# Patient Record
Sex: FEMALE | Race: OTHER | NOT HISPANIC OR LATINO | URBAN - METROPOLITAN AREA
[De-identification: names, ages, dates, MRNs, and addresses within clinical notes are randomized per-mention and may not be internally consistent; named-entity substitution may affect disease eponyms.]

---

## 2019-05-25 ENCOUNTER — EMERGENCY (EMERGENCY)
Facility: HOSPITAL | Age: 54
LOS: 1 days | Discharge: HOME | End: 2019-05-25
Attending: EMERGENCY MEDICINE
Payer: COMMERCIAL

## 2019-05-25 VITALS
SYSTOLIC BLOOD PRESSURE: 142 MMHG | HEIGHT: 60 IN | TEMPERATURE: 98 F | OXYGEN SATURATION: 96 % | WEIGHT: 158.07 LBS | HEART RATE: 92 BPM | RESPIRATION RATE: 16 BRPM | DIASTOLIC BLOOD PRESSURE: 80 MMHG

## 2019-05-25 PROCEDURE — 99283 EMERGENCY DEPT VISIT LOW MDM: CPT | Mod: 25

## 2019-05-26 PROCEDURE — 99283 EMERGENCY DEPT VISIT LOW MDM: CPT

## 2019-05-26 RX ORDER — DIPHENHYDRAMINE HCL 50 MG
1 CAPSULE ORAL
Qty: 15 | Refills: 0
Start: 2019-05-26 | End: 2019-05-30

## 2019-05-26 RX ORDER — FAMOTIDINE 10 MG/ML
1 INJECTION INTRAVENOUS
Qty: 10 | Refills: 0
Start: 2019-05-26 | End: 2019-05-30

## 2019-05-26 RX ORDER — FAMOTIDINE 10 MG/ML
20 INJECTION INTRAVENOUS ONCE
Refills: 0 | Status: COMPLETED | OUTPATIENT
Start: 2019-05-26 | End: 2019-05-26

## 2019-05-26 RX ORDER — DIPHENHYDRAMINE HCL 50 MG
25 CAPSULE ORAL ONCE
Refills: 0 | Status: COMPLETED | OUTPATIENT
Start: 2019-05-26 | End: 2019-05-26

## 2019-05-26 RX ADMIN — Medication 5 MILLIGRAM(S): at 01:01

## 2019-05-26 RX ADMIN — FAMOTIDINE 20 MILLIGRAM(S): 10 INJECTION INTRAVENOUS at 01:01

## 2019-05-26 RX ADMIN — Medication 25 MILLIGRAM(S): at 01:01

## 2019-05-26 NOTE — ED ADULT NURSE NOTE - NSSUHOSCREENINGYN_ED_ALL_ED
PRE-SEDATION ASSESSMENT    CONSENT  Consent for procedure and sedation obtained: Yes    MEDICAL HISTORY  Significant medical/surgical history: No  Past Complications with Sedation/Anesthesia: No  Significant Family History: No  Smoking History: No  Alcohol/Drug abuse: No  Possible Pregnancy (LMP): Not Applicable  Cardiac History: No  Respiratory History: No    PHYSICAL EXAM  History and Physical Reviewed: Patient has valid H&P within 30 days. I have reviewed and there are no changes.  Airway Risk History: No previous complications  Airway Anatomy : Class I  Heart : Normal  Lungs : Normal  LOC/Mental Status : Normal    OTHER FINDINGS  Reviewed current medications and allergies: Yes  Pertinent lab/diagnostic test reviewed: Yes    SEDATION RISK ASSESSMENT  Risk Status ASA: Class I - Normal, healthy patient  Plan for Sedation: Moderate Sedation  Indications for Procedure/Pre-Procedure Diagnosis and Planned Procedure: reduce anxiety  EKG Monitoring: Yes    NARRATIVE FINDINGS      Yes - the patient is able to be screened

## 2019-05-26 NOTE — ED PROVIDER NOTE - CLINICAL SUMMARY MEDICAL DECISION MAKING FREE TEXT BOX
rash that appears to be allergic  will give meds and dc home with rx  pt will follow up with pmd and given strict instructions to return if any new or concerning symptoms

## 2019-05-26 NOTE — ED PROVIDER NOTE - OBJECTIVE STATEMENT
pt with itching rash on body for two days  no new soaps, lotion, no sob no swelling of throat no fever no chills no sick contacts no cough no travel
